# Patient Record
Sex: MALE | Race: WHITE | NOT HISPANIC OR LATINO | URBAN - METROPOLITAN AREA
[De-identification: names, ages, dates, MRNs, and addresses within clinical notes are randomized per-mention and may not be internally consistent; named-entity substitution may affect disease eponyms.]

---

## 2017-03-23 ENCOUNTER — OUTPATIENT (OUTPATIENT)
Dept: OUTPATIENT SERVICES | Facility: HOSPITAL | Age: 82
LOS: 1 days | Discharge: HOME | End: 2017-03-23

## 2017-03-23 ENCOUNTER — APPOINTMENT (OUTPATIENT)
Dept: HEMATOLOGY ONCOLOGY | Facility: CLINIC | Age: 82
End: 2017-03-23

## 2017-03-23 VITALS
SYSTOLIC BLOOD PRESSURE: 167 MMHG | WEIGHT: 161 LBS | HEIGHT: 64 IN | HEART RATE: 53 BPM | RESPIRATION RATE: 15 BRPM | BODY MASS INDEX: 27.49 KG/M2 | DIASTOLIC BLOOD PRESSURE: 96 MMHG | TEMPERATURE: 97.7 F

## 2017-03-23 DIAGNOSIS — H40.9 UNSPECIFIED GLAUCOMA: ICD-10-CM

## 2017-03-23 DIAGNOSIS — K22.70 BARRETT'S ESOPHAGUS W/OUT DYSPLASIA: ICD-10-CM

## 2017-03-23 DIAGNOSIS — M10.9 GOUT, UNSPECIFIED: ICD-10-CM

## 2017-03-23 DIAGNOSIS — I25.10 ATHEROSCLEROTIC HEART DISEASE OF NATIVE CORONARY ARTERY W/OUT ANGINA PECTORIS: ICD-10-CM

## 2017-03-23 DIAGNOSIS — I10 ESSENTIAL (PRIMARY) HYPERTENSION: ICD-10-CM

## 2017-03-23 DIAGNOSIS — R53.1 WEAKNESS: ICD-10-CM

## 2017-03-23 DIAGNOSIS — N28.9 DISORDER OF KIDNEY AND URETER, UNSPECIFIED: ICD-10-CM

## 2017-03-23 DIAGNOSIS — Z80.42 FAMILY HISTORY OF MALIGNANT NEOPLASM OF PROSTATE: ICD-10-CM

## 2017-03-23 DIAGNOSIS — R06.02 SHORTNESS OF BREATH: ICD-10-CM

## 2017-03-23 DIAGNOSIS — M1A.9XX0 CHRONIC GOUT, UNSPECIFIED, W/OUT TOPHUS (TOPHI): ICD-10-CM

## 2017-03-23 DIAGNOSIS — N18.3 CHRONIC KIDNEY DISEASE, STAGE 3 (MODERATE): ICD-10-CM

## 2017-03-23 DIAGNOSIS — I25.10 ATHEROSCLEROTIC HEART DISEASE OF NATIVE CORONARY ARTERY WITHOUT ANGINA PECTORIS: ICD-10-CM

## 2017-03-23 DIAGNOSIS — J90 PLEURAL EFFUSION, NOT ELSEWHERE CLASSIFIED: ICD-10-CM

## 2017-03-23 LAB
BASOPHILS # BLD: 0.03 TH/MM3
BASOPHILS NFR BLD: 0.4 %
EOSINOPHIL # BLD: 0.21 TH/MM3
EOSINOPHIL NFR BLD: 2.8 %
ERYTHROCYTE [DISTWIDTH] IN BLOOD BY AUTOMATED COUNT: 13.8 %
GRANULOCYTES # BLD: 4.14 TH/MM3
GRANULOCYTES NFR BLD: 55.3 %
HCT VFR BLD AUTO: 34.8 %
HGB BLD-MCNC: 11.7 G/DL
IMM GRANULOCYTES # BLD: 0.01 TH/MM3
IMM GRANULOCYTES NFR BLD: 0.1 %
LYMPHOCYTES # BLD: 1.96 TH/MM3
LYMPHOCYTES NFR BLD: 26.2 %
MCH RBC QN AUTO: 32.3 PG
MCHC RBC AUTO-ENTMCNC: 33.6 G/DL
MCV RBC AUTO: 96.1 FL
MONOCYTES # BLD: 1.14 TH/MM3
MONOCYTES NFR BLD: 15.2 %
PLATELET # BLD: 207 TH/MM3
PMV BLD AUTO: 9.4 FL
RBC # BLD AUTO: 3.62 MIL/MM3
WBC # BLD: 7.49 TH/MM3

## 2017-03-23 RX ORDER — DEXLANSOPRAZOLE 60 MG/1
60 CAPSULE, DELAYED RELEASE ORAL
Refills: 0 | Status: ACTIVE | COMMUNITY

## 2017-03-23 RX ORDER — METOPROLOL SUCCINATE 100 MG/1
100 TABLET, EXTENDED RELEASE ORAL
Refills: 0 | Status: ACTIVE | COMMUNITY

## 2017-03-24 LAB
ALBUMIN SERPL-MCNC: 3.2 G/DL
ALBUMIN/GLOB SERPL: 0.76
ALP SERPL-CCNC: 118 IU/L
ALT SERPL-CCNC: 15 IU/L
ANION GAP SERPL CALC-SCNC: 9 MEQ/L
AST SERPL-CCNC: 30 IU/L
BILIRUB SERPL-MCNC: 0.7 MG/DL
BUN SERPL-MCNC: 41 MG/DL
BUN/CREAT SERPL: 21.4 %
CALCIUM SERPL-MCNC: 9 MG/DL
CHLORIDE SERPL-SCNC: 103 MEQ/L
CO2 SERPL-SCNC: 25 MEQ/L
CREAT SERPL-MCNC: 1.92 MG/DL
GFR SERPL CREATININE-BSD FRML MDRD: 33
GLUCOSE SERPL-MCNC: 73 MG/DL
LACTATE DEHYDROGENASE (NORTH): 193 IU/L
POTASSIUM SERPL-SCNC: 5.5 MMOL/L
PROT SERPL-MCNC: 7.4 G/DL
SODIUM SERPL-SCNC: 137 MEQ/L

## 2017-03-27 LAB — IGM SERPL-MCNC: 768 MG/DL

## 2017-06-27 DIAGNOSIS — C88.0 WALDENSTROM MACROGLOBULINEMIA: ICD-10-CM

## 2017-08-03 ENCOUNTER — RESULT REVIEW (OUTPATIENT)
Age: 82
End: 2017-08-03

## 2017-08-03 ENCOUNTER — APPOINTMENT (OUTPATIENT)
Dept: HEMATOLOGY ONCOLOGY | Facility: CLINIC | Age: 82
End: 2017-08-03

## 2017-08-03 ENCOUNTER — OUTPATIENT (OUTPATIENT)
Dept: OUTPATIENT SERVICES | Facility: HOSPITAL | Age: 82
LOS: 1 days | Discharge: HOME | End: 2017-08-03

## 2017-08-03 VITALS
SYSTOLIC BLOOD PRESSURE: 169 MMHG | RESPIRATION RATE: 12 BRPM | HEIGHT: 64 IN | TEMPERATURE: 96.9 F | WEIGHT: 167 LBS | BODY MASS INDEX: 28.51 KG/M2 | HEART RATE: 56 BPM | DIASTOLIC BLOOD PRESSURE: 73 MMHG

## 2017-08-03 DIAGNOSIS — H40.9 UNSPECIFIED GLAUCOMA: ICD-10-CM

## 2017-08-03 DIAGNOSIS — I10 ESSENTIAL (PRIMARY) HYPERTENSION: ICD-10-CM

## 2017-08-03 DIAGNOSIS — R06.02 SHORTNESS OF BREATH: ICD-10-CM

## 2017-08-03 DIAGNOSIS — M10.9 GOUT, UNSPECIFIED: ICD-10-CM

## 2017-08-03 DIAGNOSIS — R53.1 WEAKNESS: ICD-10-CM

## 2017-08-03 DIAGNOSIS — I25.10 ATHEROSCLEROTIC HEART DISEASE OF NATIVE CORONARY ARTERY WITHOUT ANGINA PECTORIS: ICD-10-CM

## 2017-08-03 DIAGNOSIS — N18.3 CHRONIC KIDNEY DISEASE, STAGE 3 (MODERATE): ICD-10-CM

## 2017-08-03 DIAGNOSIS — J90 PLEURAL EFFUSION, NOT ELSEWHERE CLASSIFIED: ICD-10-CM

## 2017-08-03 LAB
BASOPHILS # BLD: 0.02 TH/MM3
BASOPHILS NFR BLD: 0.3 %
EOSINOPHIL # BLD: 0.24 TH/MM3
EOSINOPHIL NFR BLD: 3.1 %
ERYTHROCYTE [DISTWIDTH] IN BLOOD BY AUTOMATED COUNT: 13.7 %
GRANULOCYTES # BLD: 4.31 TH/MM3
GRANULOCYTES NFR BLD: 55.2 %
HCT VFR BLD AUTO: 36.1 %
HGB BLD-MCNC: 11.8 G/DL
IMM GRANULOCYTES # BLD: 0 TH/MM3
IMM GRANULOCYTES NFR BLD: 0 %
LYMPHOCYTES # BLD: 2.25 TH/MM3
LYMPHOCYTES NFR BLD: 28.9 %
MCH RBC QN AUTO: 31.6 PG
MCHC RBC AUTO-ENTMCNC: 32.7 G/DL
MCV RBC AUTO: 96.5 FL
MONOCYTES # BLD: 0.97 TH/MM3
MONOCYTES NFR BLD: 12.5 %
PLATELET # BLD: 196 TH/MM3
PMV BLD AUTO: 9.8 FL
RBC # BLD AUTO: 3.74 MIL/MM3
WBC # BLD: 7.79 TH/MM3

## 2017-08-04 DIAGNOSIS — C88.0 WALDENSTROM MACROGLOBULINEMIA: ICD-10-CM

## 2017-08-04 LAB
ALBUMIN SERPL-MCNC: 3 G/DL
ALBUMIN/GLOB SERPL: 0.6
ALP SERPL-CCNC: 131 IU/L
ALT SERPL-CCNC: 17 IU/L
ANION GAP SERPL CALC-SCNC: 8 MEQ/L
AST SERPL-CCNC: 34 IU/L
BILIRUB SERPL-MCNC: 0.5 MG/DL
BUN SERPL-MCNC: 50 MG/DL
BUN/CREAT SERPL: 24.2 %
CALCIUM SERPL-MCNC: 8.9 MG/DL
CHLORIDE SERPL-SCNC: 105 MEQ/L
CO2 SERPL-SCNC: 24 MEQ/L
CREAT SERPL-MCNC: 2.07 MG/DL
GFR SERPL CREATININE-BSD FRML MDRD: 30
GLUCOSE SERPL-MCNC: 81 MG/DL
LACTATE DEHYDROGENASE (NORTH): 156 IU/L
POTASSIUM SERPL-SCNC: 5.3 MMOL/L
PROT SERPL-MCNC: 8 G/DL
SODIUM SERPL-SCNC: 137 MEQ/L

## 2017-08-07 LAB
ALBUMIN MFR SERPL ELPH: 42.6 %
ALBUMIN SERPL ELPH-MCNC: 3.5 G/DL
ALBUMIN/GLOB SERPL ELPH: 0.7 ZZ
ALPHA1 GLOB MFR SERPL ELPH: 4 %
ALPHA1 GLOB SERPL ELPH-MCNC: 0.3 G/DL
ALPHA2 GLOB MFR SERPL ELPH: 10.1 %
ALPHA2 GLOB SERPL ELPH-MCNC: 0.8 G/DL
B-GLOBULIN SERPL ELPH-MCNC: 0.6 G/DL
BETA1 GLOB MFR SERPL ELPH: 7.8 %
GAMMA GLOB MFR SERPL ELPH: 35.5 %
GAMMA GLOB SERPL ELPH-MCNC: 2.9 G/DL
IGA FLD-MCNC: 51 MG/DL
IGG FLD-MCNC: 2520 MG/DL
M PROTEIN MFR SERPL ELPH: 31.7 %
M-SPIKE SPE (NORTH): 2.6 G/DL
PROT PATTERN SERPL ELPH-IMP: 8.2 G/DL
PROT PATTERN SERPL ELPH-IMP: NORMAL
PROT SERPL-MCNC: 8.2 G/DL

## 2017-08-08 LAB
INTERPRETATION SERPL IFE-IMP: DETECTED
KAPPA LC FREE SER-MCNC: 56.1 MG/L
KAPPA LC FREE/LAMBDA FREE SER: 0.61
LAMBDA LC FREE SERPL-MCNC: 92.6 MG/L
VISC SER: 1.5 CP

## 2017-12-01 ENCOUNTER — APPOINTMENT (OUTPATIENT)
Dept: HEMATOLOGY ONCOLOGY | Facility: CLINIC | Age: 82
End: 2017-12-01

## 2017-12-01 ENCOUNTER — OUTPATIENT (OUTPATIENT)
Dept: OUTPATIENT SERVICES | Facility: HOSPITAL | Age: 82
LOS: 1 days | Discharge: HOME | End: 2017-12-01

## 2017-12-01 VITALS
HEART RATE: 50 BPM | HEIGHT: 64 IN | SYSTOLIC BLOOD PRESSURE: 188 MMHG | TEMPERATURE: 96.4 F | WEIGHT: 162 LBS | DIASTOLIC BLOOD PRESSURE: 70 MMHG | BODY MASS INDEX: 27.66 KG/M2 | RESPIRATION RATE: 12 BRPM

## 2017-12-01 LAB
BASOPHILS # BLD: 0.03 TH/MM3
BASOPHILS NFR BLD: 0.4 %
EOSINOPHIL # BLD: 0.2 TH/MM3
EOSINOPHIL NFR BLD: 2.7 %
ERYTHROCYTE [DISTWIDTH] IN BLOOD BY AUTOMATED COUNT: 14.2 %
GRANULOCYTES # BLD: 4.32 TH/MM3
GRANULOCYTES NFR BLD: 58.3 %
HCT VFR BLD AUTO: 36 %
HGB BLD-MCNC: 11.5 G/DL
IMM GRANULOCYTES # BLD: 0.01 TH/MM3
IMM GRANULOCYTES NFR BLD: 0.1 %
LYMPHOCYTES # BLD: 2.02 TH/MM3
LYMPHOCYTES NFR BLD: 27.2 %
MCH RBC QN AUTO: 31.1 PG
MCHC RBC AUTO-ENTMCNC: 31.9 G/DL
MCV RBC AUTO: 97.3 FL
MONOCYTES # BLD: 0.84 TH/MM3
MONOCYTES NFR BLD: 11.3 %
PLATELET # BLD: 208 TH/MM3
PMV BLD AUTO: 9.7 FL
RBC # BLD AUTO: 3.7 MIL/MM3
WBC # BLD: 7.42 TH/MM3

## 2017-12-04 DIAGNOSIS — C88.0 WALDENSTROM MACROGLOBULINEMIA: ICD-10-CM

## 2017-12-04 LAB
ALBUMIN SERPL-MCNC: 3 G/DL
ALBUMIN/GLOB SERPL: 0.64
ALP SERPL-CCNC: 128 IU/L
ALT SERPL-CCNC: 17 IU/L
ANION GAP SERPL CALC-SCNC: 6 MEQ/L
AST SERPL-CCNC: 30 IU/L
BILIRUB SERPL-MCNC: 0.4 MG/DL
BUN SERPL-MCNC: 44 MG/DL
BUN/CREAT SERPL: 22.9 %
CALCIUM SERPL-MCNC: 8.9 MG/DL
CHLORIDE SERPL-SCNC: 105 MEQ/L
CO2 SERPL-SCNC: 25 MEQ/L
CREAT SERPL-MCNC: 1.92 MG/DL
GFR SERPL CREATININE-BSD FRML MDRD: 33
GLUCOSE SERPL-MCNC: 76 MG/DL
IGM SERPL-MCNC: 884 MG/DL
LACTATE DEHYDROGENASE (NORTH): 193 IU/L
POTASSIUM SERPL-SCNC: 5.5 MMOL/L
PROT SERPL-MCNC: 7.7 G/DL
SODIUM SERPL-SCNC: 136 MEQ/L

## 2017-12-05 LAB — VISC SER: 1.6 CP

## 2018-05-10 ENCOUNTER — LABORATORY RESULT (OUTPATIENT)
Age: 83
End: 2018-05-10

## 2018-05-10 ENCOUNTER — APPOINTMENT (OUTPATIENT)
Dept: HEMATOLOGY ONCOLOGY | Facility: CLINIC | Age: 83
End: 2018-05-10

## 2018-05-10 ENCOUNTER — OUTPATIENT (OUTPATIENT)
Dept: OUTPATIENT SERVICES | Facility: HOSPITAL | Age: 83
LOS: 1 days | Discharge: HOME | End: 2018-05-10

## 2018-05-10 VITALS
RESPIRATION RATE: 14 BRPM | WEIGHT: 157 LBS | DIASTOLIC BLOOD PRESSURE: 86 MMHG | SYSTOLIC BLOOD PRESSURE: 181 MMHG | HEIGHT: 64 IN | BODY MASS INDEX: 26.8 KG/M2 | TEMPERATURE: 95.5 F

## 2018-05-10 DIAGNOSIS — C85.90 NON-HODGKIN LYMPHOMA, UNSPECIFIED, UNSPECIFIED SITE: ICD-10-CM

## 2018-05-10 LAB
HCT VFR BLD CALC: 34.9 %
HGB BLD-MCNC: 11.7 G/DL
MCHC RBC-ENTMCNC: 31.7 PG
MCHC RBC-ENTMCNC: 33.5 G/DL
MCV RBC AUTO: 94.6 FL
PLATELET # BLD AUTO: 200 K/UL
PMV BLD: 10.2 FL
RBC # BLD: 3.69 M/UL
RBC # FLD: 14.6 %
WBC # FLD AUTO: 9.05 K/UL

## 2018-05-11 DIAGNOSIS — C88.0 WALDENSTROM MACROGLOBULINEMIA: ICD-10-CM

## 2018-05-11 LAB
ALBUMIN SERPL ELPH-MCNC: 3.3 G/DL
ALP BLD-CCNC: 147 U/L
ALT SERPL-CCNC: 14 U/L
ANION GAP SERPL CALC-SCNC: 18 MMOL/L
AST SERPL-CCNC: 27 U/L
BILIRUB SERPL-MCNC: 0.3 MG/DL
BUN SERPL-MCNC: 50 MG/DL
CALCIUM SERPL-MCNC: 8.9 MG/DL
CHLORIDE SERPL-SCNC: 97 MMOL/L
CO2 SERPL-SCNC: 21 MMOL/L
CREAT SERPL-MCNC: 2 MG/DL
GLUCOSE SERPL-MCNC: 98 MG/DL
IGG SER QL IEP: 2310 MG/DL
IGM SER QL IEP: 847 MG/DL
LDH SERPL-CCNC: 212 U/L
POTASSIUM SERPL-SCNC: 5.1 MMOL/L
PROT SERPL-MCNC: 8.3 G/DL
SODIUM SERPL-SCNC: 136 MMOL/L

## 2018-05-14 LAB — VISCOSITY, SERUM: 2

## 2018-12-20 ENCOUNTER — OUTPATIENT (OUTPATIENT)
Dept: OUTPATIENT SERVICES | Facility: HOSPITAL | Age: 83
LOS: 1 days | Discharge: HOME | End: 2018-12-20

## 2018-12-20 ENCOUNTER — APPOINTMENT (OUTPATIENT)
Dept: HEMATOLOGY ONCOLOGY | Facility: CLINIC | Age: 83
End: 2018-12-20

## 2018-12-20 ENCOUNTER — LABORATORY RESULT (OUTPATIENT)
Age: 83
End: 2018-12-20

## 2018-12-20 VITALS
SYSTOLIC BLOOD PRESSURE: 190 MMHG | RESPIRATION RATE: 14 BRPM | HEIGHT: 64 IN | TEMPERATURE: 96.5 F | WEIGHT: 159 LBS | DIASTOLIC BLOOD PRESSURE: 83 MMHG | BODY MASS INDEX: 27.14 KG/M2 | HEART RATE: 50 BPM

## 2018-12-20 LAB
HCT VFR BLD CALC: 37.3 %
HGB BLD-MCNC: 11.9 G/DL
MCHC RBC-ENTMCNC: 31 PG
MCHC RBC-ENTMCNC: 31.9 G/DL
MCV RBC AUTO: 97.1 FL
PLATELET # BLD AUTO: 205 K/UL
PMV BLD: 10.5 FL
RBC # BLD: 3.84 M/UL
RBC # FLD: 14 %
WBC # FLD AUTO: 6.88 K/UL

## 2018-12-20 NOTE — CONSULT LETTER
[Dear  ___] : Dear  [unfilled], [Courtesy Letter:] : I had the pleasure of seeing your patient, [unfilled], in my office today. [Please see my note below.] : Please see my note below. [Consult Closing:] : Thank you very much for allowing me to participate in the care of this patient.  If you have any questions, please do not hesitate to contact me. [Sincerely,] : Sincerely, [FreeTextEntry2] : Dr. M. Kleiner [FreeTextEntry3] : Dr. ADIA Santana

## 2018-12-20 NOTE — ASSESSMENT
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 Has not been on any treatment for over couple of years (old records presently not available for review).\par \par We will repeat the CBC, CMP, LDH, IgM, viscosity.\par Further recommendations after those results are available.\par \par All their questions answered.\par \par If all stable, the patient will be seen again in 4-6 months for follow up.

## 2018-12-20 NOTE — HISTORY OF PRESENT ILLNESS
[Disease:__________________________] : Disease: [unfilled] [de-identified] : The patient is coming for his regularly scheduled follow up for his [place]strom macroglobulinemia.\par Presently, he has no new particular complaints. Specifically, no fever or night sweats, appetite stable. no headache. He has not felt any new lumps.\par Urine and bowel movements are within normal with no particular problems. [de-identified] : Chritsian

## 2018-12-20 NOTE — REASON FOR VISIT
[Follow-Up Visit] : a follow-up visit for [Lymphoma] : lymphoma [Family Member] : family member [FreeTextEntry2] : Christian

## 2018-12-20 NOTE — PHYSICAL EXAM
[Restricted in physically strenuous activity but ambulatory and able to carry out work of a light or sedentary nature] : Status 1- Restricted in physically strenuous activity but ambulatory and able to carry out work of a light or sedentary nature, e.g., light house work, office work [Normal] : grossly intact [de-identified] : But decreased hearing [de-identified] : Grade II/VI systolic murmur best heard at the aortic area. [de-identified] : Arthritic changes

## 2018-12-21 DIAGNOSIS — C88.0 WALDENSTROM MACROGLOBULINEMIA: ICD-10-CM

## 2018-12-21 LAB
ALBUMIN SERPL ELPH-MCNC: 3.5 G/DL
ALP BLD-CCNC: 165 U/L
ALT SERPL-CCNC: 16 U/L
ANION GAP SERPL CALC-SCNC: 17 MMOL/L
AST SERPL-CCNC: 38 U/L
BILIRUB SERPL-MCNC: 0.4 MG/DL
BUN SERPL-MCNC: 59 MG/DL
CALCIUM SERPL-MCNC: 9.3 MG/DL
CHLORIDE SERPL-SCNC: 99 MMOL/L
CO2 SERPL-SCNC: 22 MMOL/L
CREAT SERPL-MCNC: 2.2 MG/DL
GLUCOSE SERPL-MCNC: 99 MG/DL
LDH SERPL-CCNC: 207 U/L
POTASSIUM SERPL-SCNC: 5.5 MMOL/L
PROT SERPL-MCNC: 8.9 G/DL
SODIUM SERPL-SCNC: 138 MMOL/L

## 2018-12-24 LAB
IGM SER QL IEP: 1244 MG/DL
VISCOSITY, SERUM: 2.3

## 2019-11-11 ENCOUNTER — LABORATORY RESULT (OUTPATIENT)
Age: 84
End: 2019-11-11

## 2019-11-11 ENCOUNTER — OUTPATIENT (OUTPATIENT)
Dept: OUTPATIENT SERVICES | Facility: HOSPITAL | Age: 84
LOS: 1 days | Discharge: HOME | End: 2019-11-11

## 2019-11-11 ENCOUNTER — APPOINTMENT (OUTPATIENT)
Dept: HEMATOLOGY ONCOLOGY | Facility: CLINIC | Age: 84
End: 2019-11-11
Payer: MEDICARE

## 2019-11-11 VITALS
DIASTOLIC BLOOD PRESSURE: 75 MMHG | SYSTOLIC BLOOD PRESSURE: 180 MMHG | HEART RATE: 65 BPM | RESPIRATION RATE: 16 BRPM | HEIGHT: 64 IN | BODY MASS INDEX: 27.14 KG/M2 | WEIGHT: 159 LBS | TEMPERATURE: 96.3 F

## 2019-11-11 LAB
HCT VFR BLD CALC: 34.2 %
HGB BLD-MCNC: 11.1 G/DL
MCHC RBC-ENTMCNC: 31.3 PG
MCHC RBC-ENTMCNC: 32.5 G/DL
MCV RBC AUTO: 96.3 FL
PLATELET # BLD AUTO: 198 K/UL
PMV BLD: 10.6 FL
RBC # BLD: 3.55 M/UL
RBC # FLD: 14.5 %
WBC # FLD AUTO: 8.04 K/UL

## 2019-11-11 PROCEDURE — 99213 OFFICE O/P EST LOW 20 MIN: CPT

## 2019-11-11 RX ORDER — KETOROLAC TROMETHAMINE 30 MG/ML
30 SYRINGE (ML) INJECTION ONCE
Refills: 0 | Status: DISCONTINUED | OUTPATIENT
Start: 2019-11-11 | End: 2019-11-11

## 2019-11-12 LAB
ALBUMIN SERPL ELPH-MCNC: 3.3 G/DL
ALP BLD-CCNC: 143 U/L
ALT SERPL-CCNC: 16 U/L
ANION GAP SERPL CALC-SCNC: 16 MMOL/L
AST SERPL-CCNC: 58 U/L
BILIRUB SERPL-MCNC: 0.6 MG/DL
BUN SERPL-MCNC: 63 MG/DL
CALCIUM SERPL-MCNC: 9.2 MG/DL
CHLORIDE SERPL-SCNC: 99 MMOL/L
CO2 SERPL-SCNC: 20 MMOL/L
CREAT SERPL-MCNC: 2.2 MG/DL
GLUCOSE SERPL-MCNC: 92 MG/DL
IGM SER QL IEP: 2956 MG/DL
LDH SERPL-CCNC: 184 U/L
POTASSIUM SERPL-SCNC: 5.5 MMOL/L
PROT SERPL-MCNC: 10.2 G/DL
SODIUM SERPL-SCNC: 135 MMOL/L

## 2019-11-12 NOTE — REVIEW OF SYSTEMS
[Loss of Hearing] : loss of hearing [Lower Ext Edema] : lower extremity edema [Joint Pain] : joint pain [Joint Stiffness] : joint stiffness [SOB on Exertion] : shortness of breath during exertion [Insomnia] : insomnia [Negative] : Heme/Lymph [FreeTextEntry9] : Mild

## 2019-11-12 NOTE — CONSULT LETTER
[Courtesy Letter:] : I had the pleasure of seeing your patient, [unfilled], in my office today. [Dear  ___] : Dear  [unfilled], [Please see my note below.] : Please see my note below. [Consult Closing:] : Thank you very much for allowing me to participate in the care of this patient.  If you have any questions, please do not hesitate to contact me. [Sincerely,] : Sincerely, [FreeTextEntry2] : Dr. Morton Kleiner [FreeTextEntry3] : Dr. ADIA aSntana

## 2019-11-12 NOTE — HISTORY OF PRESENT ILLNESS
[Disease:__________________________] : Disease: [unfilled] [de-identified] : The patient is coming for his regularly scheduled follow up for his lymphoplasmacytic lymphoma which has been in remission.\par The patient is presently denying any new pains or aches although he has his chronic arthralgias. He is denying any fever or night sweats. The appetite is stable. No new problems with urine or bowel movements although he may get up several times at night to urinate.\par He is on no new medications. [de-identified] : Trung

## 2019-11-12 NOTE — PHYSICAL EXAM
[Restricted in physically strenuous activity but ambulatory and able to carry out work of a light or sedentary nature] : Status 1- Restricted in physically strenuous activity but ambulatory and able to carry out work of a light or sedentary nature, e.g., light house work, office work [Normal] : affect appropriate [de-identified] : Decreased breath sounds right base 1/2 way up with crackles (not new) [de-identified] : RR, grade III-IV/VI systolic murmur best heard at the aortic area [de-identified] : Trace peripheral edema [de-identified] : Arthritic changes present [de-identified] : Senile pupuric lesions

## 2019-11-12 NOTE — ASSESSMENT
[FreeTextEntry1] : History of lymphoplasmacytic lymphoma, S/P thoracentesis and chemotherapy a few years ago, clinically no evidence of relapse.\par \par We will repeat the blood work including CBC, CMP, LDH, IgM, viscosity. If all within acceptable limits and no evidence of progression/relapse, the patient will continue to be observed and seen again in 6 months for follow up, or sooner if new problems arise. Also, if there are significant new abnormalities In the blood work, appropriate scanning will be ordered.\par \par All questions answered.

## 2019-11-14 DIAGNOSIS — Z85.79 PERSONAL HISTORY OF OTHER MALIGNANT NEOPLASMS OF LYMPHOID, HEMATOPOIETIC AND RELATED TISSUES: ICD-10-CM

## 2019-11-14 LAB — VISCOSITY, SERUM: 3

## 2019-12-13 ENCOUNTER — APPOINTMENT (OUTPATIENT)
Dept: HEMATOLOGY ONCOLOGY | Facility: CLINIC | Age: 84
End: 2019-12-13

## 2019-12-17 ENCOUNTER — CHART COPY (OUTPATIENT)
Age: 84
End: 2019-12-17

## 2021-10-06 PROBLEM — I10 ESSENTIAL HYPERTENSION: Status: ACTIVE | Noted: 2017-03-23
